# Patient Record
Sex: FEMALE
[De-identification: names, ages, dates, MRNs, and addresses within clinical notes are randomized per-mention and may not be internally consistent; named-entity substitution may affect disease eponyms.]

---

## 2024-01-23 PROBLEM — Z00.00 ENCOUNTER FOR PREVENTIVE HEALTH EXAMINATION: Status: ACTIVE | Noted: 2024-01-23

## 2024-01-31 ENCOUNTER — APPOINTMENT (OUTPATIENT)
Dept: ENDOCRINOLOGY | Facility: CLINIC | Age: 69
End: 2024-01-31
Payer: MEDICARE

## 2024-01-31 VITALS
WEIGHT: 117 LBS | BODY MASS INDEX: 22.09 KG/M2 | HEIGHT: 61 IN | DIASTOLIC BLOOD PRESSURE: 74 MMHG | SYSTOLIC BLOOD PRESSURE: 133 MMHG | HEART RATE: 68 BPM

## 2024-01-31 PROCEDURE — 36415 COLL VENOUS BLD VENIPUNCTURE: CPT

## 2024-01-31 PROCEDURE — 99205 OFFICE O/P NEW HI 60 MIN: CPT

## 2024-01-31 NOTE — PHYSICAL EXAM
[No Acute Distress] : no acute distress [Normal Sclera/Conjunctiva] : normal sclera/conjunctiva [No Proptosis] : no proptosis [Normal Outer Ear/Nose] : the ears and nose were normal in appearance [Thyroid Not Enlarged] : the thyroid was not enlarged [Clear to Auscultation] : lungs were clear to auscultation bilaterally [Normal Rate] : heart rate was normal [No Edema] : no peripheral edema [Soft] : abdomen soft [No Stigmata of Cushings Syndrome] : no stigmata of Cushings Syndrome [Normal Gait] : normal gait [Normal Strength/Tone] : muscle strength and tone were normal [No Rash] : no rash [Normal Reflexes] : deep tendon reflexes were 2+ and symmetric [Oriented x3] : oriented to person, place, and time [Kyphosis] : kyphosis present

## 2024-02-01 LAB
ANION GAP SERPL CALC-SCNC: 17 MMOL/L
BUN SERPL-MCNC: 20 MG/DL
CALCIUM SERPL-MCNC: 10.4 MG/DL
CHLORIDE SERPL-SCNC: 96 MMOL/L
CHOLEST SERPL-MCNC: 146 MG/DL
CO2 SERPL-SCNC: 21 MMOL/L
CREAT SERPL-MCNC: 0.75 MG/DL
CREAT SPEC-SCNC: 91 MG/DL
EGFR: 86 ML/MIN/1.73M2
ESTIMATED AVERAGE GLUCOSE: 128 MG/DL
GLUCOSE SERPL-MCNC: 124 MG/DL
HBA1C MFR BLD HPLC: 6.1 %
HDLC SERPL-MCNC: 50 MG/DL
LDLC SERPL CALC-MCNC: 68 MG/DL
MICROALBUMIN 24H UR DL<=1MG/L-MCNC: 72.3 MG/DL
MICROALBUMIN/CREAT 24H UR-RTO: 798 MG/G
NONHDLC SERPL-MCNC: 96 MG/DL
POTASSIUM SERPL-SCNC: 4.5 MMOL/L
SODIUM SERPL-SCNC: 134 MMOL/L
T4 FREE SERPL-MCNC: 1.2 NG/DL
THYROGLOB AB SERPL-ACNC: <20 IU/ML
THYROPEROXIDASE AB SERPL IA-ACNC: <10 IU/ML
TRIGL SERPL-MCNC: 164 MG/DL
TSH SERPL-ACNC: 2.04 UIU/ML

## 2024-06-11 ENCOUNTER — APPOINTMENT (OUTPATIENT)
Dept: ENDOCRINOLOGY | Facility: CLINIC | Age: 69
End: 2024-06-11
Payer: MEDICARE

## 2024-06-11 VITALS
SYSTOLIC BLOOD PRESSURE: 151 MMHG | HEART RATE: 73 BPM | WEIGHT: 116 LBS | BODY MASS INDEX: 21.92 KG/M2 | DIASTOLIC BLOOD PRESSURE: 66 MMHG

## 2024-06-11 DIAGNOSIS — E11.9 TYPE 2 DIABETES MELLITUS W/OUT COMPLICATIONS: ICD-10-CM

## 2024-06-11 DIAGNOSIS — E89.0 POSTPROCEDURAL HYPOTHYROIDISM: ICD-10-CM

## 2024-06-11 PROCEDURE — 99214 OFFICE O/P EST MOD 30 MIN: CPT | Mod: 25

## 2024-06-11 PROCEDURE — 83036 HEMOGLOBIN GLYCOSYLATED A1C: CPT | Mod: QW

## 2024-06-12 NOTE — ADDENDUM
[FreeTextEntry1] : ALEX, Kaitlin Hills, am scribing for an in the presence of  Dr. Dioni Franks on this date in the following sections HISTORY OF PRESENT ILLNESS, PAST MEDICAL/FAMILY/SOCIAL HISTORY; REVIEW OF SYSTEMS; VITAL SIGNS; PHYSICAL EXAM; ASSESSMENT/PLAN.

## 2024-06-12 NOTE — DATA REVIEWED
[FreeTextEntry1] : Thyroid US  - 10/27/23 Thyroid US: In the L lobe: 3 sub centimeter nodules, the upper pole isoechoic nodule measures 1.6 x 1.1 x 0.9 and a new complex lower pole cyst measures 2 x 1.5 x 1.8.  Isthmus: Isoechoic left sided nodule 1 x 0.9 x 0.9. No suspicious adenopathy. Impression: Multiple left lobe nodules, some increasing in size and some new. Findings are suggestive of multinodular goiter. S/P left hemithyroidectomy.   - 2/06/24: Thyroid US: The thyroid isthmus thickness is 0.3 cm. The R lobe has been resected. The L lobe measures 6.0 x 2.2 x 2.1 cm. 1) L upper pole nodule measuring 1.3 x 0.8 x 0.9 cm. Morphology: Spongiform. 2) Left midpole nodule measuring 0.7 x 0.5 x 0.5 cm. Morphology: Spongiform. 3) Lower L pole nodule measuring 1.3 x 1.1 x 1.1 cm. Morphology: mixed isoechoic, wider than tall, no calcification. 4) Lower L pole nodule measuring 2.3 x 1.6 x 2.2 cm. Morphology: cystic anechoic, wider than tall, smooth, internal soft tissue nodule measuring 0.7 x 0.9 cm. There are no suspicious lymph nodes. Impression: Innumerable stable complex nodules throughout the Left lobe with a large cyst in the lower pole.

## 2024-06-12 NOTE — HISTORY OF PRESENT ILLNESS
[FreeTextEntry1] : 68 year old F pt, with Hx of thyroid nodules (dx. more than 15 years ago), followed by R hemithyroidectomy in ( ~  2014) referred by Dr. Smith, presents today to establish endocrine care.  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 NKDA  No covid  01/31/2024  CC: "I feel great, I'm not sure why I'm here. Pt was referred by Dr. Smith with hx of multinodular goiter and S/P right hemithyroidectomy. She denies difficulty breathing, difficulty swallowing, voice changes, palpitations, and GI disturbances.   Review In Labs:  Thyroid US: 10/27/23 : Impression: Multiple left lobe nodules, some increasing in size and some new. Findings are suggestive of multinodular goiter. S/P Right hemithyroidectomy.   06/11/2024 Pt presents today for endocrine follow-up.   CC: " I am feeling okay. "  [facility] Pt reports that 2 weeks ago, she had diarrhea and strong R sided abdominal pain. She was informed that she has a small R kidney stone.   10/27/23: Thyroid US:  In the L lobe: 3 sub centimeter nodules, the upper pole isoechoic nodule measures 1.6 x 1.1 x 0.9 and a new complex lower pole cyst measures 2 x 1.5 x 1.8.  Isthmus: Isoechoic left sided nodule 1 x 0.9 x 0.9. No suspicious adenopathy.   Lab Review - 06/11/24: 6.5% - 01/31/24:  6.1%, , 68, s.creat 0.75, ca 10.4, TSH 2.04, Free T4 1.2 TPO ab: negative   Current Medications: Metformin 500 mg qd, Anti rejective medicines: [Prograf (tacrolimus) 0.5 mg BID, Myfortic 360 mg, Prednisone 5 mg qd], Protonix 40 mg for a month, ASA 81 mg, Carvedilol 25 mg BID, Nifenipine 30 mg BID, vitamin d3 50 mcg qd, Atorvastatin Calcium 20 mg qd, Magnesium Oxide 400 mg 6 tablets qd, Losartan 50 mg qd,

## 2024-06-12 NOTE — REVIEW OF SYSTEMS
[As Noted in HPI] : as noted in HPI [Negative] : Heme/Lymph [Dysphagia] : no dysphagia [Dysphonia] : no dysphonia [Palpitations] : no palpitations [Shortness Of Breath] : no shortness of breath

## 2024-06-12 NOTE — PHYSICAL EXAM
[No Acute Distress] : no acute distress [Normal Sclera/Conjunctiva] : normal sclera/conjunctiva [No Proptosis] : no proptosis [Normal Outer Ear/Nose] : the ears and nose were normal in appearance [Thyroid Not Enlarged] : the thyroid was not enlarged [Clear to Auscultation] : lungs were clear to auscultation bilaterally [Normal Rate] : heart rate was normal [No Edema] : no peripheral edema [Soft] : abdomen soft [Kyphosis] : kyphosis present [No Stigmata of Cushings Syndrome] : no stigmata of Cushings Syndrome [Normal Gait] : normal gait [Normal Strength/Tone] : muscle strength and tone were normal [No Rash] : no rash [Normal Reflexes] : deep tendon reflexes were 2+ and symmetric [Oriented x3] : oriented to person, place, and time [de-identified] : Nodule palpated in the left lobe measuring approximately 1.8 cm round with smooth borders. [de-identified] : No lymph node enlargement.

## 2024-06-28 LAB — HBA1C MFR BLD HPLC: 6.5
